# Patient Record
(demographics unavailable — no encounter records)

---

## 2024-12-11 NOTE — DISCUSSION/SUMMARY
[FreeTextEntry1] : 1. HTN: remains uncontrolled. I recommend starting Labetalol 200mg BID. Continue losartan 100mg daily. Goal BP less than 130/80. Recommend low salt diet. Will order echocardiogram to assess LV wall thickness.  Follow up in 2 weeks. [EKG obtained to assist in diagnosis and management of assessed problem(s)] : EKG obtained to assist in diagnosis and management of assessed problem(s)

## 2024-12-11 NOTE — HISTORY OF PRESENT ILLNESS
[FreeTextEntry1] : 65 year old female with PMHx of anxiety, HTN presents for a cardiac evaluation. Patient has had two nose bleeds recently. She went to [facility] [place] the [place] for an uncontrolled nose bleed. They packed her nose and they had her follow up with an ENT. Her BP at that time was over 200mmHg systolic according to patient. Patient has no chest pain, dyspnea or palpitations.  Patient states she has been on losartan 100mg daily for years.  There is no history of MI, CVA, CHF, or previous coronary intervention.

## 2024-12-11 NOTE — PHYSICAL EXAM
[Normal] : moves all extremities, no focal deficits, normal speech [de-identified] :  No carotid bruits auscultated bilaterally.

## 2024-12-26 NOTE — DISCUSSION/SUMMARY
[FreeTextEntry1] : 1. HTN: Goal BP less than 130/80. Recommend low salt diet. Since starting Labetalol 200mg BID, patient's BP has been controlled, even low at times, however, she is feeling dizzy. Spinning and off balance sensation. This is constant throughout the day. She had lowered the dose to 200mg in the morning and 100mg nightly, however, this has not improved her dizziness. I asked patient to further reduce the Labetalol to 100mg BID and let us know net Monday how she is feeling. If still dizzy, we can discontinue Labetalol in favor of amlodipine. Continue losartan 100mg daily.  2. MVP: mild-moderate MR. No SBE prophylaxis required. Perioid echo surveillance. Adequate BP control to less than 130/80.  Follow up in 6 months.

## 2024-12-26 NOTE — HISTORY OF PRESENT ILLNESS
[FreeTextEntry1] : Historical Perspective: 65 year old female with PMHx of anxiety, HTN, acoustic neuroma s/p resection presents for a cardiac evaluation. Patient has had two nose bleeds recently. She went to [facility] [place] the other day for an uncontrolled nosebleed. They packed her nose and they had her follow up with an ENT. Her BP at that time was over 200mmHg systolic according to patient. Patient has no chest pain, dyspnea or palpitations.  Patient states she has been on losartan 100mg daily for years.  There is no history of MI, CVA, CHF, or previous coronary intervention.  Current Health Status: Since starting Labetalol 200mg BID, patient's BP has been controlled, even low at times, however, she is feeling dizzy. Spinning and off balance sensation. This is constant throughout the day. She had lowered the dose to 200mg in the morning and 100mg nightly, however, this has not improved her dizziness.

## 2024-12-26 NOTE — PHYSICAL EXAM
[Normal] : moves all extremities, no focal deficits, normal speech [de-identified] :  No carotid bruits auscultated bilaterally.

## 2024-12-26 NOTE — CARDIOLOGY SUMMARY
[de-identified] : 12/11/2024, NSR, normal ECG [de-identified] : 12/19/2024, LV EF 63%, normal LV diastolic function, MVP with mild-moderate MR, mild TR.